# Patient Record
Sex: FEMALE | Race: WHITE | Employment: UNEMPLOYED | URBAN - NONMETROPOLITAN AREA
[De-identification: names, ages, dates, MRNs, and addresses within clinical notes are randomized per-mention and may not be internally consistent; named-entity substitution may affect disease eponyms.]

---

## 2018-07-21 ENCOUNTER — HOSPITAL ENCOUNTER (EMERGENCY)
Age: 53
Discharge: HOME | End: 2018-07-21
Attending: FAMILY MEDICINE
Payer: COMMERCIAL

## 2018-07-21 ENCOUNTER — APPOINTMENT (OUTPATIENT)
Dept: CT IMAGING | Age: 53
End: 2018-07-21
Payer: COMMERCIAL

## 2018-07-21 VITALS
BODY MASS INDEX: 28.88 KG/M2 | RESPIRATION RATE: 16 BRPM | OXYGEN SATURATION: 99 % | HEART RATE: 66 BPM | TEMPERATURE: 98.3 F | DIASTOLIC BLOOD PRESSURE: 66 MMHG | SYSTOLIC BLOOD PRESSURE: 113 MMHG | HEIGHT: 63 IN | WEIGHT: 163 LBS

## 2018-07-21 DIAGNOSIS — G44.209 ACUTE NON INTRACTABLE TENSION-TYPE HEADACHE: Primary | ICD-10-CM

## 2018-07-21 DIAGNOSIS — G35 MS (MULTIPLE SCLEROSIS) (HCC): ICD-10-CM

## 2018-07-21 LAB
ANION GAP SERPL CALCULATED.3IONS-SCNC: 14 MEQ/L (ref 8–16)
BASOPHILS # BLD: 0.4 %
BASOPHILS ABSOLUTE: 0 THOU/MM3 (ref 0–0.1)
BUN BLDV-MCNC: 17 MG/DL (ref 7–22)
CALCIUM SERPL-MCNC: 9.5 MG/DL (ref 8.5–10.5)
CHLORIDE BLD-SCNC: 105 MEQ/L (ref 98–111)
CO2: 24 MEQ/L (ref 23–33)
CREAT SERPL-MCNC: 0.8 MG/DL (ref 0.4–1.2)
EOSINOPHIL # BLD: 2.7 %
EOSINOPHILS ABSOLUTE: 0.2 THOU/MM3 (ref 0–0.4)
ERYTHROCYTE [DISTWIDTH] IN BLOOD BY AUTOMATED COUNT: 13.2 % (ref 11.5–14.5)
ERYTHROCYTE [DISTWIDTH] IN BLOOD BY AUTOMATED COUNT: 42.1 FL (ref 35–45)
GFR SERPL CREATININE-BSD FRML MDRD: 75 ML/MIN/1.73M2
GLUCOSE BLD-MCNC: 89 MG/DL (ref 70–108)
HCT VFR BLD CALC: 44.7 % (ref 37–47)
HEMOGLOBIN: 15.1 GM/DL (ref 12–16)
IMMATURE GRANS (ABS): 0.01 THOU/MM3 (ref 0–0.07)
IMMATURE GRANULOCYTES: 0.1 %
LYMPHOCYTES # BLD: 44.2 %
LYMPHOCYTES ABSOLUTE: 4 THOU/MM3 (ref 1–4.8)
MCH RBC QN AUTO: 29.3 PG (ref 26–33)
MCHC RBC AUTO-ENTMCNC: 33.8 GM/DL (ref 32.2–35.5)
MCV RBC AUTO: 86.8 FL (ref 81–99)
MONOCYTES # BLD: 8.1 %
MONOCYTES ABSOLUTE: 0.7 THOU/MM3 (ref 0.4–1.3)
NUCLEATED RED BLOOD CELLS: 0 /100 WBC
OSMOLALITY CALCULATION: 286 MOSMOL/KG (ref 275–300)
PLATELET # BLD: 296 THOU/MM3 (ref 130–400)
PMV BLD AUTO: 9.8 FL (ref 9.4–12.4)
POTASSIUM SERPL-SCNC: 3.9 MEQ/L (ref 3.5–5.2)
RBC # BLD: 5.15 MILL/MM3 (ref 4.2–5.4)
SEDIMENTATION RATE, ERYTHROCYTE: 0 MM/HR (ref 0–20)
SEG NEUTROPHILS: 44.5 %
SEGMENTED NEUTROPHILS ABSOLUTE COUNT: 4 THOU/MM3 (ref 1.8–7.7)
SODIUM BLD-SCNC: 143 MEQ/L (ref 135–145)
WBC # BLD: 9 THOU/MM3 (ref 4.8–10.8)

## 2018-07-21 PROCEDURE — 36415 COLL VENOUS BLD VENIPUNCTURE: CPT

## 2018-07-21 PROCEDURE — 96361 HYDRATE IV INFUSION ADD-ON: CPT

## 2018-07-21 PROCEDURE — 70450 CT HEAD/BRAIN W/O DYE: CPT

## 2018-07-21 PROCEDURE — 2580000003 HC RX 258: Performed by: FAMILY MEDICINE

## 2018-07-21 PROCEDURE — 96374 THER/PROPH/DIAG INJ IV PUSH: CPT

## 2018-07-21 PROCEDURE — 85025 COMPLETE CBC W/AUTO DIFF WBC: CPT

## 2018-07-21 PROCEDURE — 80048 BASIC METABOLIC PNL TOTAL CA: CPT

## 2018-07-21 PROCEDURE — 99284 EMERGENCY DEPT VISIT MOD MDM: CPT

## 2018-07-21 PROCEDURE — 96375 TX/PRO/DX INJ NEW DRUG ADDON: CPT

## 2018-07-21 PROCEDURE — 85651 RBC SED RATE NONAUTOMATED: CPT

## 2018-07-21 PROCEDURE — 6360000002 HC RX W HCPCS: Performed by: FAMILY MEDICINE

## 2018-07-21 RX ORDER — DIPHENHYDRAMINE HYDROCHLORIDE 50 MG/ML
25 INJECTION INTRAMUSCULAR; INTRAVENOUS ONCE
Status: COMPLETED | OUTPATIENT
Start: 2018-07-21 | End: 2018-07-21

## 2018-07-21 RX ORDER — NAPROXEN 500 MG/1
500 TABLET ORAL 2 TIMES DAILY
Qty: 20 TABLET | Refills: 0 | Status: SHIPPED | OUTPATIENT
Start: 2018-07-21 | End: 2018-10-22

## 2018-07-21 RX ORDER — METHYLPREDNISOLONE SODIUM SUCCINATE 125 MG/2ML
125 INJECTION, POWDER, LYOPHILIZED, FOR SOLUTION INTRAMUSCULAR; INTRAVENOUS ONCE
Status: COMPLETED | OUTPATIENT
Start: 2018-07-21 | End: 2018-07-21

## 2018-07-21 RX ORDER — 0.9 % SODIUM CHLORIDE 0.9 %
1000 INTRAVENOUS SOLUTION INTRAVENOUS ONCE
Status: COMPLETED | OUTPATIENT
Start: 2018-07-21 | End: 2018-07-21

## 2018-07-21 RX ORDER — KETOROLAC TROMETHAMINE 30 MG/ML
30 INJECTION, SOLUTION INTRAMUSCULAR; INTRAVENOUS ONCE
Status: COMPLETED | OUTPATIENT
Start: 2018-07-21 | End: 2018-07-21

## 2018-07-21 RX ADMIN — METHYLPREDNISOLONE SODIUM SUCCINATE 125 MG: 125 INJECTION, POWDER, FOR SOLUTION INTRAMUSCULAR; INTRAVENOUS at 19:18

## 2018-07-21 RX ADMIN — KETOROLAC TROMETHAMINE 30 MG: 30 INJECTION, SOLUTION INTRAMUSCULAR at 20:13

## 2018-07-21 RX ADMIN — SODIUM CHLORIDE 1000 ML: 9 INJECTION, SOLUTION INTRAVENOUS at 19:18

## 2018-07-21 RX ADMIN — DIPHENHYDRAMINE HYDROCHLORIDE 25 MG: 50 INJECTION, SOLUTION INTRAMUSCULAR; INTRAVENOUS at 19:18

## 2018-07-21 ASSESSMENT — ENCOUNTER SYMPTOMS
SHORTNESS OF BREATH: 0
WHEEZING: 0
NAUSEA: 0
EYE PAIN: 1
COUGH: 0
DIARRHEA: 0
VOMITING: 0
BACK PAIN: 0
ABDOMINAL PAIN: 0
EYE DISCHARGE: 0
SORE THROAT: 0
RHINORRHEA: 0

## 2018-07-21 ASSESSMENT — PAIN SCALES - GENERAL
PAINLEVEL_OUTOF10: 8
PAINLEVEL_OUTOF10: 6
PAINLEVEL_OUTOF10: 10

## 2018-07-21 ASSESSMENT — PAIN DESCRIPTION - FREQUENCY: FREQUENCY: CONTINUOUS

## 2018-07-21 ASSESSMENT — PAIN DESCRIPTION - PAIN TYPE: TYPE: ACUTE PAIN

## 2018-07-21 ASSESSMENT — PAIN DESCRIPTION - DESCRIPTORS: DESCRIPTORS: SHARP;ACHING

## 2018-07-21 ASSESSMENT — PAIN DESCRIPTION - LOCATION: LOCATION: HEAD

## 2018-07-21 NOTE — ED TRIAGE NOTES
Pt presents to room 12 with reports of intermittent headaches since June 15th. Pt reports that pain is on the right frontal that radiates to occipital.  Pt reports pain was relieved by OTC medications. For the past four days, pt has had constant headaches without relief of pain with OTC medications. Pt reports hx of MS. At present time, pt is alert and oriented. Respirations even and unlabored; skin warm & dry. NAD noted.

## 2018-07-21 NOTE — ED PROVIDER NOTES
[facility]  eMERGENCY dEPARTMENT eNCOUnter          CHIEF COMPLAINT       Chief Complaint   Patient presents with    Headache       Nurses Notes reviewed and I agree except as noted in the HPI. HISTORY OF PRESENT ILLNESS    Shanon Gudino is a 46 y.o. 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 The patient reports that her headache has been ongoing intermittently since June 15th. The patient states that her headache has been constant for the past 4 days. She states that there headache is similar to the headache which led to the diagnosis of MS. The patient states that her headache is to the right side of the head and is a sharp aching pain to the front of her head and a shooting pain to the back of the head. The patient states that at times her pain has reached a 10/10 in severity and reports that the pain to the front of her head is worse than that to the back. The patient has not seen her neurologist, Dr. Carlos Saucedo in [place] [id number] for the past 2 years which was when she had her last MRI. The patient admits phonophobia, and right eye pain. She denies any photophobia, nausea, vomiting, dizziness, lightheadedness, or any other signs or symptoms at this time. The patient reports that she had 9 teeth pulled in November. The patient denies any history of migraine headache, or tension headache. The HPI was provided by the patient. REVIEW OF SYSTEMS     Review of Systems   Constitutional: Negative for appetite change, chills, fatigue and fever. HENT: Negative for congestion, ear pain, rhinorrhea and sore throat. Reports phonophobia   Eyes: Positive for pain (right eye). Negative for discharge and visual disturbance. Respiratory: Negative for cough, shortness of breath and wheezing. Cardiovascular: Negative for chest pain, palpitations and leg swelling.    Gastrointestinal: and external ear normal.   Nose: Nose normal.   Mouth/Throat: Oropharynx is clear and moist and mucous membranes are normal. No oropharyngeal exudate, posterior oropharyngeal edema or posterior oropharyngeal erythema. Eyes: Conjunctivae and EOM are normal.   Neck: Normal range of motion. Neck supple. No JVD present. Cardiovascular: Normal rate, regular rhythm, normal heart sounds, intact distal pulses and normal pulses. Exam reveals no gallop and no friction rub. No murmur heard. Pulmonary/Chest: Effort normal and breath sounds normal. No respiratory distress. She has no decreased breath sounds. She has no wheezes. She has no rhonchi. She has no rales. Abdominal: Soft. Bowel sounds are normal. She exhibits no distension. There is no tenderness. There is no rebound, no guarding and no CVA tenderness. Musculoskeletal: Normal range of motion. She exhibits no edema. Neurological: She is alert and oriented to person, place, and time. She is not disoriented. No cranial nerve deficit or sensory deficit. She exhibits normal muscle tone. Coordination normal. GCS eye subscore is 4. GCS verbal subscore is 5. GCS motor subscore is 6. Skin: Skin is warm and dry. No rash noted. She is not diaphoretic. Nursing note and vitals reviewed. DIFFERENTIAL DIAGNOSIS:   Including but not limited to: migraine headache, cluster headache, muscle tension headache, paroxysmal hemicrania, occipital neuralgia, MS exacerbation, less likely subarachnoid hemorrhage, less likely temporal arteritis    DIAGNOSTIC RESULTS     EKG: All EKG's are interpreted by the Emergency Department Physician who either signs or Co-signs this chart in the absence of a cardiologist.  EKG interpreted by Rich Wells MD:    None    RADIOLOGY: non-plain film images(s) such as CT, Ultrasound and MRI are read by the radiologist.    [address] [place] [address]   Final Result   There is no acute edema or hemorrhage.                 **This report has been

## 2018-08-17 ENCOUNTER — INITIAL CONSULT (OUTPATIENT)
Dept: NEUROLOGY | Age: 53
End: 2018-08-17
Payer: COMMERCIAL

## 2018-08-17 VITALS
DIASTOLIC BLOOD PRESSURE: 60 MMHG | WEIGHT: 166 LBS | BODY MASS INDEX: 29.41 KG/M2 | SYSTOLIC BLOOD PRESSURE: 110 MMHG | HEIGHT: 63 IN | HEART RATE: 68 BPM

## 2018-08-17 DIAGNOSIS — G35 MULTIPLE SCLEROSIS (HCC): Primary | ICD-10-CM

## 2018-08-17 DIAGNOSIS — G44.89 OTHER HEADACHE SYNDROME: ICD-10-CM

## 2018-08-17 PROCEDURE — 99244 OFF/OP CNSLTJ NEW/EST MOD 40: CPT | Performed by: PSYCHIATRY & NEUROLOGY

## 2018-08-17 NOTE — PROGRESS NOTES
Chief Complaint   Patient presents with    New Patient     [facility]     This is a 46year old female with history of multiple sclerosis diagnosed in 2010. Diagnosis was made by MRI and spinal fluid. Initial presenting symptoms was hand tremor, headache, and stuttering. She has been seen by neurology in the past and is establishing local care. She has never been on any immune modifying medications in the past by choice. No incontinence of bowel or bladder. She has never lost complete or partial vision in one or both eyes. She reports recent increase in headache in that started in June 2018. Symptoms were severe and constant. Location of headache was right eye and right occipital area. Frequency was daily. Duration of headache would last 1 day. Modifiers are she was seen by ER in July 2018 and was started on Naproxen. She is now headache free. She has never received IV steroids in the past, but she has received oral steroids and responded well. She feels her last relapse was approximately 5 years ago. No falls. She is smoking. She is not taking calcium and vitamin D. Her sleep is poor and interrupted. If given the opportunity she would take a daytime nap. She has been told she snores. No sleep study done. She struggles with fatigue. Her last MRI brain was 11/1/2016 showed white matter abnormalities of the cerebral hemispheres that can represent primary demyelinating disease again demonstrated. There is evidence of interval progression in both cerebral hemispheres but no evidence of convincing disease progression demonstrated in the posterior fossa or the imaged portions of the cervical spinal cord parenchyma. No evidence to indicate active demyelinating process currently. Cerebellar tonsillar ectopia consistent with chiari I malformation, stable.  MRI cervical spine done 11/1/2016 showed no evidence of acute abnormality or myelopathic signal characteristics to indicate primary demyelinating brain and agree with interpretation. Results for orders placed during the hospital encounter of 11/01/16   MRI Brain W WO Contrast    Narrative PROCEDURE: MRI BRAIN W WO CONTRAST    CLINICAL INFORMATION: Multiple sclerosis ([address]    COMPARISON: MRI brain, 18 September 2010. TECHNIQUE: Using a 1.5 Suzan Siemens scanner, axial T2-weighted turbo spin-echo, proton density weighted gradient recall, and diffusion weighted echoplanar imaging of the brain was performed. Sagittal fat-saturated 3-D T2-weighted volume acquisition   fluid attenuated inversion recovery images as well as sagittal pre-and postcontrast (OptiMARK, 15 mL) 3-D volume acquisition T1-weighted gradient recall images were obtained of the entire brain. Multiplanar reconstructions were provided from the   high-resolution imaging. FINDINGS: Global brain volume remains within normal limits, similar to the prior exam. There is no evidence of restricted diffusion nor findings of blood products. No convincing evidence of parenchymal edema. There is been interval development of an additional left frontal pericallosal lesion, showing no associated pathologic enhancement. Second possible small white matter lesion has developed in the posterior male of the left internal capsule, though   difficult to confirm. Stable appearance of the left cerebrum. Interval development of a small white matter lesion along the pericallosal white matter of the right frontal lobe, 3 mm maximal diameter, also having no associated pathologic enhancement. The second lesion has also developed in the right   parietal-occipital white matter, having no associated pathologic enhancement as well. There is a third lesion in the inferior right temporal lobe white matter that has developed as well. None of these show associated pathologic enhancement. No convincing white matter lesion demonstrated in the posterior fossa white matter tracts.     There is redemonstrated cerebellar tonsillar ectopia. These extend approximately 7 mm below the foramen magnum. Limited detail of the cervical canal and spinal cord show no concerning characteristics. Ventricles and extra-axial spaces are satisfactory   otherwise. Vascular structures also are grossly satisfactory. Imaged paranasal sinuses, mastoid air cells, and middle ear cavities are grossly clear. Sella contents are grossly unremarkable. Impression WHITE MATTER ABNORMALITIES OF THE CEREBRAL HEMISPHERES THAT CAN REPRESENT PRIMARY DEMYELINATING DISEASE AGAIN DEMONSTRATED. THERE IS EVIDENCE OF INTERVAL PROGRESSION IN BOTH CEREBRAL HEMISPHERES BUT NO EVIDENCE OF CONVINCING DISEASE   PROGRESSION DEMONSTRATED IN THE POSTERIOR FOSSA OR THE IMAGED PORTIONS OF THE CERVICAL SPINAL CORD PARENCHYMA. NO EVIDENCE TO INDICATE ACTIVE DEMYELINATING PROCESS CURRENTLY. CEREBELLAR TONSILLAR ECTOPIA CONSISTENT WITH CHIARI I MALFORMATION, STABLE. **This report has been created using voice recognition software. It may contain minor errors which are inherent in voice recognition technology. **          Final report electronically signed by Dr. Melyssa Montalvo on 11/1/2016 4:00 PM       Results for orders placed during the hospital encounter of 11/01/16   MRI Cervical Spine W WO Contrast    Narrative PROCEDURE: MRI CERVICAL SPINE W WO CONTRAST    CLINICAL INFORMATION: Multiple sclerosis ([address]    COMPARISON: Cervical spine MRI, 30 March 2010. TECHNIQUE:  Using a 1.5 Suzan siemens scanner, sagittal T2 weighted turbo spin-echo, T2-weighted short tau inversion recovery, and pre-and postcontrast T1-weighted turbo spin-echo images of the cervical spine were obtained. Georgina Winslow  Axial T2-weighted gradient   recall and T2-weighted turbo spin-echo images as well as pre-and postcontrast (OptiMARK, 15 mL) axial T1-weighted turbo spin-echo images were obtained en block fashion, extending from the odontoid base inferiorly through C7-T1 disc space, oriented   grossly parallel to the C5-6 disc space. FINDINGS: The alignment of the osseous framework is satisfactory. There has been interval progression of degenerative change in the disc spaces and the adjacent endplates at I3-8 and P5-5 and C6-7. There is no evidence of fracture. No aggressive lytic or   blastic process. Diffusely desiccated intervertebral disc material demonstrated. There are no convincing signal abnormalities of the imaged portions of the spinal cord. There is no pathologic enhancement within any portion of the thecal sac, to the limits imaged. The foramen magnum is narrowed secondary to bilateral cerebellar tonsillar ectopia, the tonsil extending approximately 7 mm below the foramen magnum on both sides. This is a stable finding. At C2-3, the canal and foramina are widely patent, though there are mild facet degenerative changes. Facet degeneration has progressed since the prior exam. No focal disc abnormality. There is left more than right facet degeneration at C3-4. There are small uncovertebral osteophyte formation on the left also. Resultant mild/moderate left foraminal stenosis produced. There is no significant canal or right foraminal stenosis. Interval   progression of left foraminal stenosis has occurred secondary to the interval worsening of the left facet degenerative change. The C4-5 canal is very mildly narrowed due to disc osteophyte complex, progressed since the prior exam. Flattening of the ventral surface of the spinal cord, especially on the right, results. Uncovertebral and facet osteophytes combine to produce right   more than left mild foraminal stenosis, also progressed since the prior exam. Mild facet degeneration evident bilaterally. At C5-6, there is disc osteophyte complex extending into the canal incompletely effacing the subarachnoid spaces and mildly flattening the spinal cord contour, especially on the right, consistent with mild central canal stenosis.  This has achievable. FINDINGS:    Parenchyma: There is no edema or mass effect. Ventricles and sulci: Normal size for age. Other: There is no acute intracranial hemorrhage. No calvarial fracture. There are no air fluid levels. Impression There is no acute edema or hemorrhage. **This report has been created using voice recognition software. It may contain minor errors which are inherent in voice recognition technology. **    Final report electronically signed by Dr. Ana Muhammad on 7/21/2018 7:53 PM     We reviewed the patient records from referring provider and available information in the EHR       ASSESSMENT:      Diagnosis Orders   1. Multiple sclerosis ([facility] [address]     2. Other headache syndrome        This is a 59-year-old female with history of multiple sclerosis diagnosed in 2010, she is not on immune modifying medications, she presents with right retro-orbital, frontal headache. She has a nonfocal exam.  The patient headaches are new since 6/2018. She denies any jaw claudication, or vision changes. She needs to be screened for demyelinating process, versus inflammatory. She also has symptoms suggestive of an underlying sleep disorder. She was counseled about her symptoms, and plan going forward, she may benefit from immune modifying medications depending on results of imaging studies. She has questions reflecting understanding, and agreed with the following plan       Plan    1. MRI Brain with and without contrast.   2. Sleep evaluation. 3. Consider starting immune modifying medications depending on results of MRI studies of the brain. 4. B12, Folate. 5. Sedrate. 6. Take calcium and vitamin D daily. 7. Take primrose oil  8. Need to quit smoking. 9. Call with any new symptoms or concerns. 10. Follow up in 1 months.      Miguel Naqvi MD

## 2018-08-17 NOTE — PATIENT INSTRUCTIONS
1. MRI Brain with and without contrast.   2. Sleep evaluation. 3. B12, Folate. 4. Sedrate. 5. Take calcium and vitamin D daily. 6. Take primrose oil  7. Need to quit smoking. 8. Call with any new symptoms or concerns. 9. Follow up in 1 months.

## 2018-09-04 ENCOUNTER — HOSPITAL ENCOUNTER (OUTPATIENT)
Age: 53
Discharge: HOME | End: 2018-09-04
Payer: COMMERCIAL

## 2018-09-04 ENCOUNTER — HOSPITAL ENCOUNTER (OUTPATIENT)
Dept: MRI IMAGING | Age: 53
Discharge: HOME | End: 2018-09-04
Payer: COMMERCIAL

## 2018-09-04 DIAGNOSIS — G35 MULTIPLE SCLEROSIS (HCC): ICD-10-CM

## 2018-09-04 DIAGNOSIS — G44.89 OTHER HEADACHE SYNDROME: ICD-10-CM

## 2018-09-04 LAB
SEDIMENTATION RATE, ERYTHROCYTE: 7 MM/HR (ref 0–20)
VITAMIN B-12: 551 PG/ML (ref 211–911)

## 2018-09-04 PROCEDURE — 70553 MRI BRAIN STEM W/O & W/DYE: CPT

## 2018-09-04 PROCEDURE — A9579 GAD-BASE MR CONTRAST NOS,1ML: HCPCS | Performed by: PSYCHIATRY & NEUROLOGY

## 2018-09-04 PROCEDURE — 6360000004 HC RX CONTRAST MEDICATION: Performed by: PSYCHIATRY & NEUROLOGY

## 2018-09-04 PROCEDURE — 85651 RBC SED RATE NONAUTOMATED: CPT

## 2018-09-04 PROCEDURE — 82607 VITAMIN B-12: CPT

## 2018-09-04 PROCEDURE — 36415 COLL VENOUS BLD VENIPUNCTURE: CPT

## 2018-09-04 RX ADMIN — GADOTERIDOL 15 ML: 279.3 INJECTION, SOLUTION INTRAVENOUS at 10:13

## 2018-10-22 ENCOUNTER — OFFICE VISIT (OUTPATIENT)
Dept: NEUROLOGY | Age: 53
End: 2018-10-22
Payer: COMMERCIAL

## 2018-10-22 VITALS
WEIGHT: 166 LBS | HEART RATE: 68 BPM | BODY MASS INDEX: 29.41 KG/M2 | HEIGHT: 63 IN | SYSTOLIC BLOOD PRESSURE: 126 MMHG | DIASTOLIC BLOOD PRESSURE: 60 MMHG

## 2018-10-22 DIAGNOSIS — G44.89 OTHER HEADACHE SYNDROME: ICD-10-CM

## 2018-10-22 DIAGNOSIS — G35 MULTIPLE SCLEROSIS (HCC): Primary | ICD-10-CM

## 2018-10-22 PROCEDURE — 99213 OFFICE O/P EST LOW 20 MIN: CPT | Performed by: PSYCHIATRY & NEUROLOGY

## 2018-10-22 RX ORDER — TOPIRAMATE 50 MG/1
TABLET, FILM COATED ORAL
Qty: 30 TABLET | Refills: 3 | Status: SHIPPED | OUTPATIENT
Start: 2018-10-22

## 2018-10-22 NOTE — PROGRESS NOTES
NEUROLOGY OUT PATIENT FOLLOW UP NOTE:  10/22/10598:56 PM    Antonino Allen is here for follow up for multiple sclerosis, headache. She is not on immune modifying medications. The patient reports no new symptoms, she underwent an MRI of the brain, she had testing performed, she is here to go over the results. ROS:  Cardiac: no chest pain. No palpitations. Renal : no flank pain, no hematuria    Skin: no rash      Allergies   Allergen Reactions    Codeine Hives    Sulfa Antibiotics Rash       Current Outpatient Prescriptions:     venlafaxine (EFFEXOR) 75 MG tablet, Take 75 mg by mouth 2 times daily. , Disp: , Rfl:       PE:   Vitals:    10/22/18 1539   BP: 126/60   Site: Left Upper Arm   Position: Sitting   Cuff Size: Medium Adult   Pulse: 68   Weight: 166 lb (75.3 kg)   Height: 5' 3\" (1.6 m)     General Appearance:  alert and cooperative  Skin:  Skin color, texture, turgor normal. No rashes or lesions. Gen: NAD, Language is Intact. Head: no rash, no icterus  Neck: There is no carotid bruits. The Neck is supple. Neuro: CN 2-12 grossly intact with no focal deficits. Power 5/5 Throughout symmetric, Reflexes are +2 symmetric. Long tracts are intact. Cerebellar exam is Intact. Sensory exam is intact to light touch. Gait is intact. Musculoskeletal:  Has no hand arthritis, no limitation of ROM in any of the four extremities. Lower extremities no edema        DATA:  Results for orders placed or performed during the hospital encounter of 09/04/18   Sedimentation Rate   Result Value Ref Range    Sed Rate 7 0 - 20 mm/hr   Vitamin B12   Result Value Ref Range    Vitamin B-12 551 211 - 911 pg/mL           I reviewed the MRI  brain and agree with interpretation. Results for orders placed during the hospital encounter of 09/04/18   MRI BRAIN W WO CONTRAST    Narrative PROCEDURE: MRI BRAIN W WO CONTRAST    CLINICAL INFORMATION: Multiple sclerosis ([address] Other headache syndrome.     COMPARISON: MRI of the brain

## 2019-02-05 ENCOUNTER — HOSPITAL ENCOUNTER (EMERGENCY)
Age: 54
Discharge: HOME | End: 2019-02-05
Payer: COMMERCIAL

## 2019-02-05 ENCOUNTER — APPOINTMENT (OUTPATIENT)
Dept: GENERAL RADIOLOGY | Age: 54
End: 2019-02-05
Payer: COMMERCIAL

## 2019-02-05 VITALS
HEART RATE: 83 BPM | DIASTOLIC BLOOD PRESSURE: 71 MMHG | SYSTOLIC BLOOD PRESSURE: 101 MMHG | OXYGEN SATURATION: 97 % | TEMPERATURE: 97.5 F | WEIGHT: 166 LBS | HEIGHT: 63 IN | BODY MASS INDEX: 29.41 KG/M2 | RESPIRATION RATE: 16 BRPM

## 2019-02-05 DIAGNOSIS — J40 BRONCHITIS: Primary | ICD-10-CM

## 2019-02-05 DIAGNOSIS — F17.200 TOBACCO DEPENDENCE: ICD-10-CM

## 2019-02-05 PROCEDURE — 2709999900 HC NON-CHARGEABLE SUPPLY

## 2019-02-05 PROCEDURE — 6370000000 HC RX 637 (ALT 250 FOR IP): Performed by: PHYSICIAN ASSISTANT

## 2019-02-05 PROCEDURE — 94640 AIRWAY INHALATION TREATMENT: CPT

## 2019-02-05 PROCEDURE — 71046 X-RAY EXAM CHEST 2 VIEWS: CPT

## 2019-02-05 PROCEDURE — 99283 EMERGENCY DEPT VISIT LOW MDM: CPT

## 2019-02-05 RX ORDER — PREDNISONE 20 MG/1
TABLET ORAL
Qty: 18 TABLET | Refills: 0 | Status: SHIPPED | OUTPATIENT
Start: 2019-02-05 | End: 2019-02-26

## 2019-02-05 RX ORDER — GUAIFENESIN 1200 MG/1
1200 TABLET, EXTENDED RELEASE ORAL 2 TIMES DAILY
Qty: 20 TABLET | Refills: 0 | Status: SHIPPED | OUTPATIENT
Start: 2019-02-05 | End: 2019-02-26

## 2019-02-05 RX ORDER — ALBUTEROL SULFATE 90 UG/1
2 AEROSOL, METERED RESPIRATORY (INHALATION) EVERY 4 HOURS PRN
Qty: 1 INHALER | Refills: 0 | Status: SHIPPED | OUTPATIENT
Start: 2019-02-05

## 2019-02-05 RX ORDER — IPRATROPIUM BROMIDE AND ALBUTEROL SULFATE 2.5; .5 MG/3ML; MG/3ML
1 SOLUTION RESPIRATORY (INHALATION) ONCE
Status: COMPLETED | OUTPATIENT
Start: 2019-02-05 | End: 2019-02-05

## 2019-02-05 RX ADMIN — IPRATROPIUM BROMIDE AND ALBUTEROL SULFATE 1 AMPULE: .5; 3 SOLUTION RESPIRATORY (INHALATION) at 13:34

## 2019-02-05 ASSESSMENT — ENCOUNTER SYMPTOMS
SINUS PRESSURE: 1
WHEEZING: 0
EYE ITCHING: 0
SHORTNESS OF BREATH: 0
SORE THROAT: 0
RHINORRHEA: 0
DIARRHEA: 1
ABDOMINAL PAIN: 0
COUGH: 1
NAUSEA: 0
EYE DISCHARGE: 0
CHEST TIGHTNESS: 0
VOMITING: 0

## 2019-02-26 ENCOUNTER — INITIAL CONSULT (OUTPATIENT)
Dept: PULMONOLOGY | Age: 54
End: 2019-02-26
Payer: COMMERCIAL

## 2019-02-26 VITALS
HEIGHT: 63 IN | DIASTOLIC BLOOD PRESSURE: 70 MMHG | SYSTOLIC BLOOD PRESSURE: 114 MMHG | HEART RATE: 75 BPM | OXYGEN SATURATION: 100 % | BODY MASS INDEX: 28.35 KG/M2 | WEIGHT: 160 LBS

## 2019-02-26 DIAGNOSIS — G89.29 CHRONIC NONINTRACTABLE HEADACHE, UNSPECIFIED HEADACHE TYPE: ICD-10-CM

## 2019-02-26 DIAGNOSIS — G47.8 UNREFRESHED BY SLEEP: ICD-10-CM

## 2019-02-26 DIAGNOSIS — R06.83 SNORING: ICD-10-CM

## 2019-02-26 DIAGNOSIS — D32.9 MENINGIOMA (HCC): ICD-10-CM

## 2019-02-26 DIAGNOSIS — R51.9 CHRONIC NONINTRACTABLE HEADACHE, UNSPECIFIED HEADACHE TYPE: ICD-10-CM

## 2019-02-26 DIAGNOSIS — G47.00 DIFFICULTY FALLING ASLEEP AT NIGHT UNTIL EARLY MORNING HOURS: ICD-10-CM

## 2019-02-26 DIAGNOSIS — G93.5 CHIARI MALFORMATION TYPE I (HCC): ICD-10-CM

## 2019-02-26 DIAGNOSIS — G35 MULTIPLE SCLEROSIS (HCC): ICD-10-CM

## 2019-02-26 DIAGNOSIS — G47.10 HYPERSOMNIA: Primary | ICD-10-CM

## 2019-02-26 PROCEDURE — 99203 OFFICE O/P NEW LOW 30 MIN: CPT | Performed by: INTERNAL MEDICINE

## 2019-04-17 ENCOUNTER — TELEPHONE (OUTPATIENT)
Dept: SLEEP CENTER | Age: 54
End: 2019-04-17

## 2019-04-24 ENCOUNTER — TELEPHONE (OUTPATIENT)
Dept: SLEEP CENTER | Age: 54
End: 2019-04-24

## 2019-04-24 NOTE — TELEPHONE ENCOUNTER
Voice messages left on April 10, 17, 22 and 24 asking Shanon to call the sleep center to schedule a sleep study. The sleep center will no longer call the patient to schedule this study.